# Patient Record
Sex: FEMALE | Race: BLACK OR AFRICAN AMERICAN | HISPANIC OR LATINO
[De-identification: names, ages, dates, MRNs, and addresses within clinical notes are randomized per-mention and may not be internally consistent; named-entity substitution may affect disease eponyms.]

---

## 2018-05-11 ENCOUNTER — APPOINTMENT (OUTPATIENT)
Dept: OBGYN | Facility: CLINIC | Age: 28
End: 2018-05-11

## 2018-10-04 ENCOUNTER — APPOINTMENT (OUTPATIENT)
Dept: OBGYN | Facility: CLINIC | Age: 28
End: 2018-10-04
Payer: COMMERCIAL

## 2018-10-04 VITALS
HEART RATE: 79 BPM | SYSTOLIC BLOOD PRESSURE: 124 MMHG | BODY MASS INDEX: 33.15 KG/M2 | HEIGHT: 64 IN | DIASTOLIC BLOOD PRESSURE: 84 MMHG | WEIGHT: 194.19 LBS

## 2018-10-04 DIAGNOSIS — Z78.9 OTHER SPECIFIED HEALTH STATUS: ICD-10-CM

## 2018-10-04 PROCEDURE — 99395 PREV VISIT EST AGE 18-39: CPT

## 2018-10-05 LAB
C TRACH RRNA SPEC QL NAA+PROBE: NOT DETECTED
HPV HIGH+LOW RISK DNA PNL CVX: NOT DETECTED
N GONORRHOEA RRNA SPEC QL NAA+PROBE: NOT DETECTED
SOURCE TP AMPLIFICATION: NORMAL

## 2018-10-10 LAB — CYTOLOGY CVX/VAG DOC THIN PREP: NORMAL

## 2018-12-19 ENCOUNTER — RX RENEWAL (OUTPATIENT)
Age: 28
End: 2018-12-19

## 2019-03-18 ENCOUNTER — RX RENEWAL (OUTPATIENT)
Age: 29
End: 2019-03-18

## 2019-06-06 ENCOUNTER — RX RENEWAL (OUTPATIENT)
Age: 29
End: 2019-06-06

## 2019-08-30 ENCOUNTER — RX RENEWAL (OUTPATIENT)
Age: 29
End: 2019-08-30

## 2019-11-20 ENCOUNTER — RX RENEWAL (OUTPATIENT)
Age: 29
End: 2019-11-20

## 2019-11-20 RX ORDER — NORGESTIMATE AND ETHINYL ESTRADIOL 7DAYSX3 LO
0.18/0.215/0.25 KIT ORAL
Qty: 84 | Refills: 0 | Status: ACTIVE | COMMUNITY
Start: 2019-03-18 | End: 1900-01-01

## 2020-02-29 ENCOUNTER — RX RENEWAL (OUTPATIENT)
Age: 30
End: 2020-02-29

## 2020-08-10 ENCOUNTER — APPOINTMENT (OUTPATIENT)
Dept: OBGYN | Facility: CLINIC | Age: 30
End: 2020-08-10
Payer: COMMERCIAL

## 2020-08-10 VITALS
DIASTOLIC BLOOD PRESSURE: 91 MMHG | WEIGHT: 204.13 LBS | BODY MASS INDEX: 35.04 KG/M2 | SYSTOLIC BLOOD PRESSURE: 125 MMHG

## 2020-08-10 PROCEDURE — 99214 OFFICE O/P EST MOD 30 MIN: CPT

## 2020-08-10 NOTE — COUNSELING
[Vitamins/Supplements] : vitamins/supplements [Breast Self Exam] : breast self exam [Nutrition] : nutrition [Exercise] : exercise [Safe Sexual Practices] : safe sexual practices [STD (testing, results, tx)] : STD (testing, results, tx)

## 2020-08-14 LAB — CYTOLOGY CVX/VAG DOC THIN PREP: NORMAL

## 2021-03-30 ENCOUNTER — APPOINTMENT (OUTPATIENT)
Dept: OBGYN | Facility: CLINIC | Age: 31
End: 2021-03-30
Payer: COMMERCIAL

## 2021-03-30 ENCOUNTER — TRANSCRIPTION ENCOUNTER (OUTPATIENT)
Age: 31
End: 2021-03-30

## 2021-03-30 VITALS
WEIGHT: 210 LBS | DIASTOLIC BLOOD PRESSURE: 94 MMHG | SYSTOLIC BLOOD PRESSURE: 131 MMHG | BODY MASS INDEX: 35.85 KG/M2 | HEIGHT: 64 IN

## 2021-03-30 PROCEDURE — 99072 ADDL SUPL MATRL&STAF TM PHE: CPT

## 2021-03-30 PROCEDURE — 99213 OFFICE O/P EST LOW 20 MIN: CPT

## 2021-04-22 ENCOUNTER — APPOINTMENT (OUTPATIENT)
Dept: ULTRASOUND IMAGING | Facility: CLINIC | Age: 31
End: 2021-04-22
Payer: COMMERCIAL

## 2021-04-22 ENCOUNTER — OUTPATIENT (OUTPATIENT)
Dept: OUTPATIENT SERVICES | Facility: HOSPITAL | Age: 31
LOS: 1 days | End: 2021-04-22
Payer: COMMERCIAL

## 2021-04-22 ENCOUNTER — RESULT REVIEW (OUTPATIENT)
Age: 31
End: 2021-04-22

## 2021-04-22 DIAGNOSIS — N64.89 OTHER SPECIFIED DISORDERS OF BREAST: ICD-10-CM

## 2021-04-22 PROCEDURE — 76641 ULTRASOUND BREAST COMPLETE: CPT | Mod: 26,50

## 2021-04-22 PROCEDURE — 76641 ULTRASOUND BREAST COMPLETE: CPT

## 2021-05-11 ENCOUNTER — RESULT REVIEW (OUTPATIENT)
Age: 31
End: 2021-05-11

## 2021-05-11 ENCOUNTER — APPOINTMENT (OUTPATIENT)
Dept: OBGYN | Facility: CLINIC | Age: 31
End: 2021-05-11
Payer: COMMERCIAL

## 2021-05-11 VITALS
DIASTOLIC BLOOD PRESSURE: 95 MMHG | SYSTOLIC BLOOD PRESSURE: 136 MMHG | HEIGHT: 64 IN | BODY MASS INDEX: 35.54 KG/M2 | HEART RATE: 91 BPM | WEIGHT: 208.19 LBS

## 2021-05-11 DIAGNOSIS — N60.19 DIFFUSE CYSTIC MASTOPATHY OF UNSPECIFIED BREAST: ICD-10-CM

## 2021-05-11 PROCEDURE — 99213 OFFICE O/P EST LOW 20 MIN: CPT

## 2021-05-11 PROCEDURE — 99072 ADDL SUPL MATRL&STAF TM PHE: CPT

## 2021-06-03 ENCOUNTER — RESULT REVIEW (OUTPATIENT)
Age: 31
End: 2021-06-03

## 2021-06-08 ENCOUNTER — APPOINTMENT (OUTPATIENT)
Dept: ULTRASOUND IMAGING | Facility: CLINIC | Age: 31
End: 2021-06-08
Payer: COMMERCIAL

## 2021-06-08 ENCOUNTER — RESULT REVIEW (OUTPATIENT)
Age: 31
End: 2021-06-08

## 2021-06-08 ENCOUNTER — OUTPATIENT (OUTPATIENT)
Dept: OUTPATIENT SERVICES | Facility: HOSPITAL | Age: 31
LOS: 1 days | End: 2021-06-08
Payer: COMMERCIAL

## 2021-06-08 DIAGNOSIS — N60.19 DIFFUSE CYSTIC MASTOPATHY OF UNSPECIFIED BREAST: ICD-10-CM

## 2021-06-08 DIAGNOSIS — Z00.8 ENCOUNTER FOR OTHER GENERAL EXAMINATION: ICD-10-CM

## 2021-06-08 PROCEDURE — 76642 ULTRASOUND BREAST LIMITED: CPT

## 2021-06-08 PROCEDURE — 76642 ULTRASOUND BREAST LIMITED: CPT | Mod: 26,LT

## 2021-07-01 ENCOUNTER — RX RENEWAL (OUTPATIENT)
Age: 31
End: 2021-07-01

## 2021-10-22 ENCOUNTER — APPOINTMENT (OUTPATIENT)
Dept: ULTRASOUND IMAGING | Facility: CLINIC | Age: 31
End: 2021-10-22
Payer: COMMERCIAL

## 2021-10-22 ENCOUNTER — RESULT REVIEW (OUTPATIENT)
Age: 31
End: 2021-10-22

## 2021-10-22 ENCOUNTER — OUTPATIENT (OUTPATIENT)
Dept: OUTPATIENT SERVICES | Facility: HOSPITAL | Age: 31
LOS: 1 days | End: 2021-10-22
Payer: COMMERCIAL

## 2021-10-22 DIAGNOSIS — N60.19 DIFFUSE CYSTIC MASTOPATHY OF UNSPECIFIED BREAST: ICD-10-CM

## 2021-10-22 PROCEDURE — 76642 ULTRASOUND BREAST LIMITED: CPT | Mod: 26,RT

## 2021-10-22 PROCEDURE — 76642 ULTRASOUND BREAST LIMITED: CPT

## 2022-04-20 ENCOUNTER — APPOINTMENT (OUTPATIENT)
Dept: ORTHOPEDIC SURGERY | Facility: CLINIC | Age: 32
End: 2022-04-20
Payer: COMMERCIAL

## 2022-04-20 VITALS — BODY MASS INDEX: 34.16 KG/M2 | WEIGHT: 205 LBS | HEIGHT: 65 IN

## 2022-04-20 DIAGNOSIS — S93.602A UNSPECIFIED SPRAIN OF LEFT FOOT, INITIAL ENCOUNTER: ICD-10-CM

## 2022-04-20 DIAGNOSIS — Q74.2 OTHER CONGENITAL MALFORMATIONS OF LOWER LIMB(S), INCLUDING PELVIC GIRDLE: ICD-10-CM

## 2022-04-20 PROCEDURE — 99204 OFFICE O/P NEW MOD 45 MIN: CPT

## 2022-04-20 PROCEDURE — L4361: CPT

## 2022-04-20 NOTE — HISTORY OF PRESENT ILLNESS
[Sudden] : sudden [4] : 4 [Rest] : rest [0] : 0 [Walking] : walking [de-identified] : 4/20/22: fall 1 week ago w/ foot pain. went to  and given post op shoe. no prior foot probs. no dm/tob. RN  [] : no [FreeTextEntry1] : left foot [de-identified] : xray [FreeTextEntry3] : 4/14/22

## 2022-04-20 NOTE — PHYSICAL EXAM
[Left] : left foot and ankle [Mild] : mild swelling of medial foot [5___] : plantar flexion 5[unfilled]/5 [2+] : dorsalis pedis pulse: 2+ [] : mildly antalgic [de-identified] : plantar flexion 30 degrees [TWNoteComboBox7] : dorsiflexion 10 degrees

## 2022-04-20 NOTE — IMAGING
[Left] : left foot [Outside films reviewed] : Outside films reviewed [de-identified] : accessory navic. no acute fx

## 2022-04-20 NOTE — ASSESSMENT
[FreeTextEntry1] : wbat\par cam boot\par ice/elevate\par nsaids prn\par out from work\par f/up 2 wks\par The patient's current medication management of their orthopedic diagnosis was reviewed today:\par \par (1) We discussed a comprehensive treatment plan that included possible pharmaceutical management involving the use of prescription strength medications including but not limited to options such as oral Naprosyn 500mg BID, once daily Meloxicam 15 mg, or 500-650 mg Tylenol versus over the counter oral medications and topical prescription NSAID Pennsaid vs over the counter Voltaren gel.\par (2) There is a moderate risk of morbidity with further treatment, especially from use of prescription strength medications and possible side effects of these medications which include upset stomach with oral medications, skin reactions to topical medications and cardiac/renal issues with long term use.\par (3) I recommended that the patient follow-up with their medical physician to discuss any significant specific potential issues with long term medication use such as interactions with current medications or with exacerbation of underlying medical comorbidities.\par (4) The benefits and risks associated with use of injectable, oral or topical, prescription and over the counter anti-inflammatory medications were discussed with the patient. The patient voiced understanding of the risks including but not limited to bleeding, stroke, kidney dysfunction, heart disease, and were referred to the black box warning label for further information.\par \par

## 2022-05-04 ENCOUNTER — APPOINTMENT (OUTPATIENT)
Dept: ORTHOPEDIC SURGERY | Facility: CLINIC | Age: 32
End: 2022-05-04
Payer: COMMERCIAL

## 2022-05-04 DIAGNOSIS — S93.601D UNSPECIFIED SPRAIN OF RIGHT FOOT, SUBSEQUENT ENCOUNTER: ICD-10-CM

## 2022-05-04 PROCEDURE — 99213 OFFICE O/P EST LOW 20 MIN: CPT

## 2022-05-04 NOTE — PHYSICAL EXAM
[Mild] : mild swelling of medial foot [2+] : dorsalis pedis pulse: 2+ [Right] : right foot and ankle [NL (40)] : plantar flexion 40 degrees [NL (20)] : eversion 20 degrees [5___] : eversion 5[unfilled]/5 [] : not mildly antalgic [FreeTextEntry8] : improved [de-identified] : plantar flexion 30 degrees [de-identified] : inversion 20 degrees [TWNoteComboBox7] : dorsiflexion 10 degrees

## 2022-05-04 NOTE — HISTORY OF PRESENT ILLNESS
[Sudden] : sudden [0] : 0 [Rest] : rest [Walking] : walking [de-identified] : 4/20/22: fall 1 week ago w/ foot pain. went to  and given post op shoe. no prior foot probs. no dm/tob. RN \par 5/4/22: pain improving. walking in boot [] : no [FreeTextEntry1] : left foot [FreeTextEntry3] : 4/14/22 [de-identified] : boot [de-identified] : xray

## 2022-05-04 NOTE — ASSESSMENT
[FreeTextEntry1] : wbat\par supportive shoe\par ice/elevate\par nsaids prn\par PT\par return to work\par f/up prn

## 2022-10-03 ENCOUNTER — RESULT REVIEW (OUTPATIENT)
Age: 32
End: 2022-10-03

## 2022-10-24 ENCOUNTER — OUTPATIENT (OUTPATIENT)
Dept: OUTPATIENT SERVICES | Facility: HOSPITAL | Age: 32
LOS: 1 days | End: 2022-10-24
Payer: COMMERCIAL

## 2022-10-24 ENCOUNTER — APPOINTMENT (OUTPATIENT)
Dept: ULTRASOUND IMAGING | Facility: CLINIC | Age: 32
End: 2022-10-24

## 2022-10-24 ENCOUNTER — RESULT REVIEW (OUTPATIENT)
Age: 32
End: 2022-10-24

## 2022-10-24 DIAGNOSIS — N60.19 DIFFUSE CYSTIC MASTOPATHY OF UNSPECIFIED BREAST: ICD-10-CM

## 2022-10-24 PROCEDURE — 76642 ULTRASOUND BREAST LIMITED: CPT | Mod: 26,RT

## 2022-10-24 PROCEDURE — 76642 ULTRASOUND BREAST LIMITED: CPT

## 2022-12-30 ENCOUNTER — RESULT REVIEW (OUTPATIENT)
Age: 32
End: 2022-12-30

## 2022-12-30 ENCOUNTER — APPOINTMENT (OUTPATIENT)
Dept: OBGYN | Facility: CLINIC | Age: 32
End: 2022-12-30
Payer: COMMERCIAL

## 2022-12-30 VITALS
HEIGHT: 64 IN | BODY MASS INDEX: 35.68 KG/M2 | WEIGHT: 209 LBS | SYSTOLIC BLOOD PRESSURE: 136 MMHG | DIASTOLIC BLOOD PRESSURE: 90 MMHG

## 2022-12-30 DIAGNOSIS — N94.6 DYSMENORRHEA, UNSPECIFIED: ICD-10-CM

## 2022-12-30 PROCEDURE — 99395 PREV VISIT EST AGE 18-39: CPT

## 2022-12-30 RX ORDER — NORGESTIMATE AND ETHINYL ESTRADIOL 7DAYSX3 LO
0.18/0.215/0.25 KIT ORAL DAILY
Qty: 90 | Refills: 0 | Status: ACTIVE | COMMUNITY
Start: 2018-10-04 | End: 1900-01-01

## 2022-12-30 NOTE — PHYSICAL EXAM
[Chaperone Present] : A chaperone was present in the examining room during all aspects of the physical examination [FreeTextEntry1] : Gwen [Appropriately responsive] : appropriately responsive [Alert] : alert [No Acute Distress] : no acute distress [No Lymphadenopathy] : no lymphadenopathy [Regular Rate Rhythm] : regular rate rhythm [No Murmurs] : no murmurs [Clear to Auscultation B/L] : clear to auscultation bilaterally [Soft] : soft [Non-tender] : non-tender [Non-distended] : non-distended [No HSM] : No HSM [No Lesions] : no lesions [No Mass] : no mass [Oriented x3] : oriented x3 [Examination Of The Breasts] : a normal appearance [No Masses] : no breast masses were palpable [Labia Majora] : normal [Labia Minora] : normal [Normal] : normal [Uterine Adnexae] : normal

## 2023-01-09 ENCOUNTER — RX RENEWAL (OUTPATIENT)
Age: 33
End: 2023-01-09

## 2023-01-09 LAB — CYTOLOGY CVX/VAG DOC THIN PREP: NORMAL

## 2023-01-09 RX ORDER — NORGESTIMATE AND ETHINYL ESTRADIOL 7DAYSX3 LO
0.18/0.215/0.25 KIT ORAL DAILY
Qty: 28 | Refills: 0 | Status: ACTIVE | COMMUNITY
Start: 2020-08-10 | End: 1900-01-01

## 2023-04-24 ENCOUNTER — APPOINTMENT (OUTPATIENT)
Dept: ULTRASOUND IMAGING | Facility: CLINIC | Age: 33
End: 2023-04-24
Payer: COMMERCIAL

## 2023-04-24 ENCOUNTER — OUTPATIENT (OUTPATIENT)
Dept: OUTPATIENT SERVICES | Facility: HOSPITAL | Age: 33
LOS: 1 days | End: 2023-04-24
Payer: COMMERCIAL

## 2023-04-24 ENCOUNTER — RESULT REVIEW (OUTPATIENT)
Age: 33
End: 2023-04-24

## 2023-04-24 DIAGNOSIS — N60.19 DIFFUSE CYSTIC MASTOPATHY OF UNSPECIFIED BREAST: ICD-10-CM

## 2023-04-24 PROCEDURE — 76642 ULTRASOUND BREAST LIMITED: CPT | Mod: 26,RT

## 2023-04-24 PROCEDURE — 76642 ULTRASOUND BREAST LIMITED: CPT

## 2024-03-05 ENCOUNTER — APPOINTMENT (OUTPATIENT)
Dept: OBGYN | Facility: CLINIC | Age: 34
End: 2024-03-05
Payer: COMMERCIAL

## 2024-03-05 VITALS
WEIGHT: 222.1 LBS | HEIGHT: 64 IN | BODY MASS INDEX: 37.92 KG/M2 | SYSTOLIC BLOOD PRESSURE: 132 MMHG | DIASTOLIC BLOOD PRESSURE: 78 MMHG

## 2024-03-05 DIAGNOSIS — Z30.09 ENCOUNTER FOR OTHER GENERAL COUNSELING AND ADVICE ON CONTRACEPTION: ICD-10-CM

## 2024-03-05 DIAGNOSIS — Z01.419 ENCOUNTER FOR GYNECOLOGICAL EXAMINATION (GENERAL) (ROUTINE) W/OUT ABNORMAL FINDINGS: ICD-10-CM

## 2024-03-05 PROCEDURE — 99395 PREV VISIT EST AGE 18-39: CPT

## 2024-03-05 RX ORDER — DESOGESTREL AND ETHINYL ESTRADIOL 0.15-0.03
0.15-3 KIT ORAL DAILY
Qty: 3 | Refills: 3 | Status: ACTIVE | COMMUNITY
Start: 2024-03-05 | End: 1900-01-01

## 2024-03-05 NOTE — HISTORY OF PRESENT ILLNESS
[Condoms] : uses condoms [Y] : Patient is sexually active [N] : Patient denies prior pregnancies [Frequency: Q ___ days] : menstrual periods occur approximately every [unfilled] days [Regular Cycle Intervals] : periods have been regular [Menarche Age: ____] : age at menarche was [unfilled] [FreeTextEntry1] : 33-year-old G0, P0 last menstrual cycle was February 27, 2024 for 5 days presents for GYN evaluation.  She is sexually active and desires oral contraceptive pills for contraception.  No pain bleeding or discharge. [PGHxTotal] : 0 [PGHxFullTerm] : 0 [PGHxPremature] : 0 [PGHxAbortions] : 0 [[facility] [PGHxABInduced] : 0 [PGHxABSpont] : 0 [PGHxEctopic] : 0 [PGHxMultBirths] : 0

## 2024-03-05 NOTE — DISCUSSION/SUMMARY
[FreeTextEntry1] : 33-year-old G0, P0 presents for routine GYN evaluation.  Last menstrual cycle was February 27, 2024.  She is sexually active and desires oral contraceptive pills. Apri prescribed.  Return in 6 months for follow-up.

## 2024-03-05 NOTE — PHYSICAL EXAM
[Chaperone Present] : A chaperone was present in the examining room during all aspects of the physical examination [FreeTextEntry1] : kamille [Appropriately responsive] : appropriately responsive [Alert] : alert [No Acute Distress] : no acute distress [No Lymphadenopathy] : no lymphadenopathy [Regular Rate Rhythm] : regular rate rhythm [No Murmurs] : no murmurs [Clear to Auscultation B/L] : clear to auscultation bilaterally [Non-tender] : non-tender [Soft] : soft [Non-distended] : non-distended [No HSM] : No HSM [No Lesions] : no lesions [No Mass] : no mass [Oriented x3] : oriented x3 [Examination Of The Breasts] : a normal appearance [No Masses] : no breast masses were palpable [Labia Majora] : normal [Labia Minora] : normal [Normal] : normal [Uterine Adnexae] : normal [Declined] : Patient declined rectal exam

## 2024-03-08 LAB — CYTOLOGY CVX/VAG DOC THIN PREP: NORMAL

## 2025-04-07 ENCOUNTER — APPOINTMENT (OUTPATIENT)
Dept: OBGYN | Facility: CLINIC | Age: 35
End: 2025-04-07
Payer: COMMERCIAL

## 2025-04-07 VITALS
SYSTOLIC BLOOD PRESSURE: 122 MMHG | BODY MASS INDEX: 38.98 KG/M2 | HEIGHT: 64 IN | DIASTOLIC BLOOD PRESSURE: 80 MMHG | WEIGHT: 228.31 LBS

## 2025-04-07 DIAGNOSIS — Z30.09 ENCOUNTER FOR OTHER GENERAL COUNSELING AND ADVICE ON CONTRACEPTION: ICD-10-CM

## 2025-04-07 DIAGNOSIS — Z01.419 ENCOUNTER FOR GYNECOLOGICAL EXAMINATION (GENERAL) (ROUTINE) W/OUT ABNORMAL FINDINGS: ICD-10-CM

## 2025-04-07 DIAGNOSIS — Z00.00 ENCOUNTER FOR GENERAL ADULT MEDICAL EXAMINATION W/OUT ABNORMAL FINDINGS: ICD-10-CM

## 2025-04-07 PROCEDURE — 99459 PELVIC EXAMINATION: CPT

## 2025-04-07 PROCEDURE — 99215 OFFICE O/P EST HI 40 MIN: CPT | Mod: 25

## 2025-04-07 PROCEDURE — 99395 PREV VISIT EST AGE 18-39: CPT

## 2025-04-11 LAB — CYTOLOGY CVX/VAG DOC THIN PREP: NORMAL
